# Patient Record
Sex: MALE
[De-identification: names, ages, dates, MRNs, and addresses within clinical notes are randomized per-mention and may not be internally consistent; named-entity substitution may affect disease eponyms.]

---

## 2020-03-27 ENCOUNTER — APPOINTMENT (OUTPATIENT)
Dept: DISASTER EMERGENCY | Facility: CLINIC | Age: 30
End: 2020-03-27
Payer: COMMERCIAL

## 2020-03-27 ENCOUNTER — LABORATORY RESULT (OUTPATIENT)
Age: 30
End: 2020-03-27

## 2020-03-27 VITALS
HEART RATE: 93 BPM | RESPIRATION RATE: 16 BRPM | TEMPERATURE: 98.7 F | OXYGEN SATURATION: 96 % | SYSTOLIC BLOOD PRESSURE: 128 MMHG | DIASTOLIC BLOOD PRESSURE: 72 MMHG

## 2020-03-27 DIAGNOSIS — R50.9 FEVER, UNSPECIFIED: ICD-10-CM

## 2020-03-27 DIAGNOSIS — R05 COUGH: ICD-10-CM

## 2020-03-27 DIAGNOSIS — K21.9 GASTRO-ESOPHAGEAL REFLUX DISEASE W/OUT ESOPHAGITIS: ICD-10-CM

## 2020-03-27 DIAGNOSIS — R68.89 OTHER GENERAL SYMPTOMS AND SIGNS: ICD-10-CM

## 2020-03-27 PROBLEM — Z00.00 ENCOUNTER FOR PREVENTIVE HEALTH EXAMINATION: Status: ACTIVE | Noted: 2020-03-27

## 2020-03-27 PROCEDURE — 99203 OFFICE O/P NEW LOW 30 MIN: CPT

## 2020-03-27 NOTE — PHYSICAL EXAM
[No Acute Distress] : no acute distress [Well Nourished] : well nourished [Well Developed] : well developed [Well-Appearing] : well-appearing [Normal Sclera/Conjunctiva] : normal sclera/conjunctiva [PERRL] : pupils equal round and reactive to light [EOMI] : extraocular movements intact [Normal Outer Ear/Nose] : the outer ears and nose were normal in appearance [Normal Oropharynx] : the oropharynx was normal [No JVD] : no jugular venous distention [No Lymphadenopathy] : no lymphadenopathy [Supple] : supple [Thyroid Normal, No Nodules] : the thyroid was normal and there were no nodules present [No Respiratory Distress] : no respiratory distress  [No Accessory Muscle Use] : no accessory muscle use [Clear to Auscultation] : lungs were clear to auscultation bilaterally [Normal Rate] : normal rate  [Regular Rhythm] : with a regular rhythm [Normal S1, S2] : normal S1 and S2 [No Murmur] : no murmur heard [No Carotid Bruits] : no carotid bruits [No Edema] : there was no peripheral edema [No Extremity Clubbing/Cyanosis] : no extremity clubbing/cyanosis [Soft] : abdomen soft [Non Tender] : non-tender [Non-distended] : non-distended [No Masses] : no abdominal mass palpated [No HSM] : no HSM [Normal Bowel Sounds] : normal bowel sounds [Normal Posterior Cervical Nodes] : no posterior cervical lymphadenopathy [Normal Anterior Cervical Nodes] : no anterior cervical lymphadenopathy [No Joint Swelling] : no joint swelling [Grossly Normal Strength/Tone] : grossly normal strength/tone [Normal Gait] : normal gait [Normal Affect] : the affect was normal [Normal Insight/Judgement] : insight and judgment were intact [No Rash] : no rash [Speech Grossly Normal] : speech grossly normal [Normal Mood] : the mood was normal

## 2020-05-05 PROBLEM — R68.89 SUSPECTED 2019 NOVEL CORONAVIRUS INFECTION: Status: ACTIVE | Noted: 2020-03-27

## 2020-05-05 PROBLEM — R50.9 FEVER AND CHILLS: Status: ACTIVE | Noted: 2020-03-27

## 2020-05-05 PROBLEM — R05 COUGH: Status: ACTIVE | Noted: 2020-03-27

## 2020-05-05 NOTE — ASSESSMENT
[FreeTextEntry1] : Mr. INFANTE is a 29 year  male, who present to the office for COVID 19 evaluation

## 2020-05-05 NOTE — HISTORY OF PRESENT ILLNESS
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

## 2020-05-05 NOTE — PLAN
[FreeTextEntry1] : Patient  education  - COVID-19   Counseling and education provided to the patient.  Advised sign and symptoms of the virus.  Advised contact precautions.  Educated patient on proper hand washing and to participate in social distancing. \par \par Patient advised to self quarantine for 14 days. Discussed in full detail what that means.\par \par Advised patient to notify his recent close contacts and to  advised them that you are being evaluated for high suspicion of  COVID 19. They should monitor for signs and symptoms as we discussed.  If they are feeling ill they should contact their provider for medical advise.  \par \par We discussed how COVID is tested.  Which is via nasopharyngeal swab.  Patient agrees to testing.\par \par Advised and educated patient to seek immediate medical care at the ED department  if they feel SOB, BANKS, orthopnea, or dizziness upon standing.   \par \par Patient is in full awareness of the plan and agrees to it.  \par \par Hand out was given to the patient to refer to for more information.

## 2020-09-13 ENCOUNTER — TRANSCRIPTION ENCOUNTER (OUTPATIENT)
Age: 30
End: 2020-09-13

## 2025-01-15 ENCOUNTER — NON-APPOINTMENT (OUTPATIENT)
Age: 35
End: 2025-01-15